# Patient Record
Sex: MALE
[De-identification: names, ages, dates, MRNs, and addresses within clinical notes are randomized per-mention and may not be internally consistent; named-entity substitution may affect disease eponyms.]

---

## 2024-07-31 ENCOUNTER — APPOINTMENT (OUTPATIENT)
Dept: OTOLARYNGOLOGY | Facility: CLINIC | Age: 79
End: 2024-07-31
Payer: MEDICARE

## 2024-07-31 VITALS — BODY MASS INDEX: 24.2 KG/M2 | HEIGHT: 70 IN | WEIGHT: 169 LBS

## 2024-07-31 DIAGNOSIS — Z87.39 PERSONAL HISTORY OF OTHER DISEASES OF THE MUSCULOSKELETAL SYSTEM AND CONNECTIVE TISSUE: ICD-10-CM

## 2024-07-31 DIAGNOSIS — Z78.9 OTHER SPECIFIED HEALTH STATUS: ICD-10-CM

## 2024-07-31 DIAGNOSIS — H91.22 SUDDEN IDIOPATHIC HEARING LOSS, LEFT EAR: ICD-10-CM

## 2024-07-31 DIAGNOSIS — H93.299 OTHER ABNORMAL AUDITORY PERCEPTIONS, UNSPECIFIED EAR: ICD-10-CM

## 2024-07-31 DIAGNOSIS — Z82.3 FAMILY HISTORY OF STROKE: ICD-10-CM

## 2024-07-31 DIAGNOSIS — Z83.3 FAMILY HISTORY OF DIABETES MELLITUS: ICD-10-CM

## 2024-07-31 DIAGNOSIS — Z86.39 PERSONAL HISTORY OF OTHER ENDOCRINE, NUTRITIONAL AND METABOLIC DISEASE: ICD-10-CM

## 2024-07-31 DIAGNOSIS — H73.92 UNSPECIFIED DISORDER OF TYMPANIC MEMBRANE, LEFT EAR: ICD-10-CM

## 2024-07-31 DIAGNOSIS — H90.3 SENSORINEURAL HEARING LOSS, BILATERAL: ICD-10-CM

## 2024-07-31 PROBLEM — Z00.00 ENCOUNTER FOR PREVENTIVE HEALTH EXAMINATION: Status: ACTIVE | Noted: 2024-07-31

## 2024-07-31 PROCEDURE — 92504 EAR MICROSCOPY EXAMINATION: CPT

## 2024-07-31 PROCEDURE — 92550 TYMPANOMETRY & REFLEX THRESH: CPT | Mod: 52

## 2024-07-31 PROCEDURE — 99203 OFFICE O/P NEW LOW 30 MIN: CPT

## 2024-07-31 PROCEDURE — 92557 COMPREHENSIVE HEARING TEST: CPT

## 2024-07-31 RX ORDER — METOPROLOL TARTRATE 75 MG/1
TABLET, FILM COATED ORAL
Refills: 0 | Status: ACTIVE | COMMUNITY

## 2024-07-31 RX ORDER — UBIDECARENONE 200 MG
CAPSULE ORAL
Refills: 0 | Status: ACTIVE | COMMUNITY

## 2024-07-31 RX ORDER — TAMSULOSIN HYDROCHLORIDE 0.4 MG/1
CAPSULE ORAL
Refills: 0 | Status: ACTIVE | COMMUNITY

## 2024-07-31 RX ORDER — OMEPRAZOLE 20 MG/1
TABLET, DELAYED RELEASE ORAL
Refills: 0 | Status: ACTIVE | COMMUNITY

## 2024-07-31 RX ORDER — METHIMAZOLE 5 MG/1
TABLET ORAL
Refills: 0 | Status: ACTIVE | COMMUNITY

## 2024-07-31 NOTE — HISTORY OF PRESENT ILLNESS
[de-identified] : DEANA MOONEY has a history of sudden hearing loss in the left ear. This started when he woke up one morning in April 2024. He had been treated previously with oral and IT steroids with no improvement. Also reports sudden onset of Graves' Disease in January 2024 and was hospitalized for 1 week. Follows with endocrinology.  He does not wear hearing aids.  Wife reports pt had Covid in October 2023.

## 2024-07-31 NOTE — ASSESSMENT
[FreeTextEntry1] : History consistent with sudden hearing loss in the left ear approximately 3 months ago.  This was treated with oral and intratympanic steroids.  Significant-severe hearing loss remains in the left ear with poor speech recognition.  I have discussed management strategies in detail.  Further intervention is unlikely to provide benefit.  We discussed management of his hearing loss in detail.  We discussed the options which include but are not limited to cros hearing technologies, implantation and observation.  I have encouraged him to consider amplification in the right ear.  Follow-up with repeat audiometry in 3 months suggested.

## 2024-07-31 NOTE — PHYSICAL EXAM
[Normal] : mucosa is normal [Midline] : trachea located in midline position [FreeTextEntry1] : Procedure: Microscopic Ear Exam  Right ear:   Ear canal intact without inflammation or lesion.   Tympanic membrane intact without inflammation.  Left ear:   Ear canal intact without inflammation or lesion.  Tympanic membrane intact.  Area of atrophy consistent with prior intratympanic treatment.  No inflammation.